# Patient Record
Sex: MALE | Race: OTHER
[De-identification: names, ages, dates, MRNs, and addresses within clinical notes are randomized per-mention and may not be internally consistent; named-entity substitution may affect disease eponyms.]

---

## 2021-12-14 ENCOUNTER — HOSPITAL ENCOUNTER (EMERGENCY)
Dept: HOSPITAL 61 - ER | Age: 41
Discharge: HOME | End: 2021-12-14
Payer: SELF-PAY

## 2021-12-14 VITALS — WEIGHT: 185.19 LBS | BODY MASS INDEX: 27.43 KG/M2 | HEIGHT: 69 IN

## 2021-12-14 VITALS — SYSTOLIC BLOOD PRESSURE: 114 MMHG | DIASTOLIC BLOOD PRESSURE: 81 MMHG

## 2021-12-14 DIAGNOSIS — Z88.0: ICD-10-CM

## 2021-12-14 DIAGNOSIS — F17.200: ICD-10-CM

## 2021-12-14 DIAGNOSIS — K04.7: Primary | ICD-10-CM

## 2021-12-14 PROCEDURE — 99283 EMERGENCY DEPT VISIT LOW MDM: CPT
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
